# Patient Record
Sex: MALE | Race: WHITE | NOT HISPANIC OR LATINO | URBAN - METROPOLITAN AREA
[De-identification: names, ages, dates, MRNs, and addresses within clinical notes are randomized per-mention and may not be internally consistent; named-entity substitution may affect disease eponyms.]

---

## 2017-08-04 ENCOUNTER — OUTPATIENT (OUTPATIENT)
Dept: OUTPATIENT SERVICES | Facility: HOSPITAL | Age: 57
LOS: 1 days | End: 2017-08-04

## 2019-12-09 ENCOUNTER — APPOINTMENT (OUTPATIENT)
Dept: RADIOLOGY | Facility: CLINIC | Age: 59
End: 2019-12-09
Payer: COMMERCIAL

## 2019-12-09 PROCEDURE — 71046 X-RAY EXAM CHEST 2 VIEWS: CPT

## 2020-01-27 ENCOUNTER — APPOINTMENT (OUTPATIENT)
Dept: ULTRASOUND IMAGING | Facility: CLINIC | Age: 60
End: 2020-01-27
Payer: COMMERCIAL

## 2020-01-27 PROCEDURE — 93970 EXTREMITY STUDY: CPT

## 2020-11-05 PROBLEM — Z00.00 ENCOUNTER FOR PREVENTIVE HEALTH EXAMINATION: Status: ACTIVE | Noted: 2020-11-05

## 2021-04-29 ENCOUNTER — NON-APPOINTMENT (OUTPATIENT)
Age: 61
End: 2021-04-29

## 2021-04-30 ENCOUNTER — NON-APPOINTMENT (OUTPATIENT)
Age: 61
End: 2021-04-30

## 2021-04-30 ENCOUNTER — APPOINTMENT (OUTPATIENT)
Dept: CARDIOLOGY | Facility: CLINIC | Age: 61
End: 2021-04-30
Payer: COMMERCIAL

## 2021-04-30 VITALS
DIASTOLIC BLOOD PRESSURE: 80 MMHG | OXYGEN SATURATION: 96 % | WEIGHT: 171 LBS | BODY MASS INDEX: 25.91 KG/M2 | TEMPERATURE: 97.7 F | HEIGHT: 68 IN | HEART RATE: 74 BPM | SYSTOLIC BLOOD PRESSURE: 142 MMHG

## 2021-04-30 DIAGNOSIS — F10.21 ALCOHOL DEPENDENCE, IN REMISSION: ICD-10-CM

## 2021-04-30 DIAGNOSIS — Z82.0 FAMILY HISTORY OF EPILEPSY AND OTHER DISEASES OF THE NERVOUS SYSTEM: ICD-10-CM

## 2021-04-30 DIAGNOSIS — Z78.9 OTHER SPECIFIED HEALTH STATUS: ICD-10-CM

## 2021-04-30 DIAGNOSIS — F12.90 CANNABIS USE, UNSPECIFIED, UNCOMPLICATED: ICD-10-CM

## 2021-04-30 DIAGNOSIS — R45.89 OTHER SYMPTOMS AND SIGNS INVOLVING EMOTIONAL STATE: ICD-10-CM

## 2021-04-30 DIAGNOSIS — Z82.3 FAMILY HISTORY OF STROKE: ICD-10-CM

## 2021-04-30 DIAGNOSIS — Z85.46 PERSONAL HISTORY OF MALIGNANT NEOPLASM OF PROSTATE: ICD-10-CM

## 2021-04-30 DIAGNOSIS — Z82.49 FAMILY HISTORY OF ISCHEMIC HEART DISEASE AND OTHER DISEASES OF THE CIRCULATORY SYSTEM: ICD-10-CM

## 2021-04-30 PROCEDURE — 99072 ADDL SUPL MATRL&STAF TM PHE: CPT

## 2021-04-30 PROCEDURE — 93246 EXT ECG>7D<15D RECORDING: CPT

## 2021-04-30 PROCEDURE — 99205 OFFICE O/P NEW HI 60 MIN: CPT

## 2021-04-30 PROCEDURE — 93000 ELECTROCARDIOGRAM COMPLETE: CPT | Mod: 59

## 2021-04-30 RX ORDER — ENZALUTAMIDE 80 MG/1
TABLET ORAL DAILY
Refills: 0 | Status: ACTIVE | COMMUNITY

## 2021-04-30 RX ORDER — BICALUTAMIDE 50 MG/1
50 TABLET ORAL DAILY
Refills: 0 | Status: ACTIVE | COMMUNITY

## 2021-04-30 NOTE — HISTORY OF PRESENT ILLNESS
[FreeTextEntry1] : The patient is complaining of palpitations.  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  He describes a skipping-like sensation in his chest.  He feels a pause.  There has been no syncope.  There has been no near syncope.  The maximum duration of his palpitations is under 1 minute.  There is been no change recently.  The patient was seen by his primary care doctor and found to be slightly hypertensive.  The patient states that he does not take his blood pressure at home.  He previously was hypertensive and then quit alcohol.  He became normotensive off of pharmacologic therapy.  The patient has no exertional chest discomfort.  There is some dyspnea with heavy exertion which he feels may be normal for him.  The patient is extremely active.  He is a surf casting guide.

## 2021-04-30 NOTE — ASSESSMENT
[FreeTextEntry1] : Palpitations: Unfortunate the patient only has palpitations fairly infrequently.  14-day event monitoring has been arranged.\par \par Shortness of breath: Transthoracic echocardiography is indicated to assess for pericardial disease.  Exercise stress testing has been arranged to rule out ischemia.\par \par Hypertension: The patient will check his blood pressure at home.  Overall we have elected against pharmacologic therapy at this time but if he is persistently hypertensive at follow-up we will consider pharmacologic therapy.

## 2021-05-14 ENCOUNTER — APPOINTMENT (OUTPATIENT)
Dept: CARDIOLOGY | Facility: CLINIC | Age: 61
End: 2021-05-14
Payer: COMMERCIAL

## 2021-05-14 PROCEDURE — 99072 ADDL SUPL MATRL&STAF TM PHE: CPT

## 2021-05-14 PROCEDURE — 93306 TTE W/DOPPLER COMPLETE: CPT

## 2021-05-14 PROCEDURE — 93015 CV STRESS TEST SUPVJ I&R: CPT

## 2021-05-18 ENCOUNTER — NON-APPOINTMENT (OUTPATIENT)
Age: 61
End: 2021-05-18

## 2021-05-31 ENCOUNTER — NON-APPOINTMENT (OUTPATIENT)
Age: 61
End: 2021-05-31

## 2021-06-01 ENCOUNTER — NON-APPOINTMENT (OUTPATIENT)
Age: 61
End: 2021-06-01

## 2021-06-02 PROCEDURE — 99072 ADDL SUPL MATRL&STAF TM PHE: CPT

## 2021-06-02 PROCEDURE — 93248 EXT ECG>7D<15D REV&INTERPJ: CPT

## 2021-06-08 ENCOUNTER — APPOINTMENT (OUTPATIENT)
Dept: CARDIOLOGY | Facility: CLINIC | Age: 61
End: 2021-06-08
Payer: COMMERCIAL

## 2021-06-08 VITALS
HEART RATE: 86 BPM | TEMPERATURE: 97.6 F | WEIGHT: 165 LBS | HEIGHT: 68 IN | SYSTOLIC BLOOD PRESSURE: 136 MMHG | DIASTOLIC BLOOD PRESSURE: 66 MMHG | BODY MASS INDEX: 25.01 KG/M2 | OXYGEN SATURATION: 98 %

## 2021-06-08 DIAGNOSIS — I10 ESSENTIAL (PRIMARY) HYPERTENSION: ICD-10-CM

## 2021-06-08 PROCEDURE — 99214 OFFICE O/P EST MOD 30 MIN: CPT

## 2021-06-08 PROCEDURE — 99072 ADDL SUPL MATRL&STAF TM PHE: CPT

## 2021-06-08 NOTE — HISTORY OF PRESENT ILLNESS
[FreeTextEntry1] : Palpitations: Ever since taking his monitor off the patient has had no palpitations at all.  The patient shows good exercise capacity without exertional symptoms.\par \par High degree AV block: There was very brief high degree AV block on event monitoring.  It did not correlate with his symptoms.  The patient reported some symptoms during normal sinus rhythm.  The patient is reluctant to consider pacemaker therapy.  EP evaluation is scheduled.\par \par Hypertension: Well-controlled.

## 2021-06-28 ENCOUNTER — APPOINTMENT (OUTPATIENT)
Dept: ELECTROPHYSIOLOGY | Facility: CLINIC | Age: 61
End: 2021-06-28
Payer: COMMERCIAL

## 2021-06-28 ENCOUNTER — NON-APPOINTMENT (OUTPATIENT)
Age: 61
End: 2021-06-28

## 2021-06-28 VITALS
BODY MASS INDEX: 25.76 KG/M2 | DIASTOLIC BLOOD PRESSURE: 60 MMHG | TEMPERATURE: 97.5 F | SYSTOLIC BLOOD PRESSURE: 110 MMHG | WEIGHT: 170 LBS | OXYGEN SATURATION: 97 % | HEART RATE: 56 BPM | HEIGHT: 68 IN

## 2021-06-28 DIAGNOSIS — R00.2 PALPITATIONS: ICD-10-CM

## 2021-06-28 PROCEDURE — 99072 ADDL SUPL MATRL&STAF TM PHE: CPT

## 2021-06-28 PROCEDURE — 93000 ELECTROCARDIOGRAM COMPLETE: CPT

## 2021-06-28 PROCEDURE — 99203 OFFICE O/P NEW LOW 30 MIN: CPT

## 2021-06-28 RX ORDER — TAMSULOSIN HYDROCHLORIDE 0.4 MG/1
0.4 CAPSULE ORAL EVERY OTHER DAY
Refills: 0 | Status: DISCONTINUED | COMMUNITY
End: 2021-06-28

## 2021-07-11 PROBLEM — R00.2 PALPITATIONS: Status: ACTIVE | Noted: 2021-04-30

## 2021-07-11 NOTE — DISCUSSION/SUMMARY
[FreeTextEntry1] : Patient symptoms (palpitations) started with increased family stress as well as Covid infection.  His palpitations corresponded to skipped beats as well as  very short episodes of atrial tach 3-7 beats.   He did have an episode of AV block occurring around 11:30 AM with a short episode and this appeared to be second-degree.  His symptoms have since resolved and he is not having  palpitations.    His baseline EKG does show a borderline first-degree AV block.\par \par He does not need pacing this time but we will continue to monitor him and should he have recurrent symptoms we will get extended monitoring to see if he has recurrent atrial arrhythmias or AV block..  \par Also recommend a Lyme's titer.  \par Follow-up evaluation electrophysiology based on symptoms or abnormalities seen in follow-up monitoring..

## 2021-07-11 NOTE — CARDIOLOGY SUMMARY
[de-identified] : 6/28/2021: Sinus  Rhythm 62 bpm\par OR interval 210 ms, QRS duration 90 ms, QT interval 400 ms\par \par QRS axis normal

## 2021-07-11 NOTE — REVIEW OF SYSTEMS
[Feeling Fatigued] : feeling fatigued [Negative] : Musculoskeletal [Fever] : no fever [Blurry Vision] : no blurred vision [Sore Throat] : no sore throat [Dyspnea on exertion] : not dyspnea during exertion [Cough] : no cough [Abdominal Pain] : no abdominal pain [Hematuria] : no hematuria [Rash] : no rash [Dizziness] : no dizziness [Under Stress] : not under stress [Easy Bleeding] : no tendency for easy bleeding

## 2021-07-11 NOTE — PHYSICAL EXAM
[Well Developed] : well developed [No Acute Distress] : no acute distress [Normal Conjunctiva] : normal conjunctiva [Normal Venous Pressure] : normal venous pressure [Normal S1, S2] : normal S1, S2 [No Murmur] : no murmur [No Rub] : no rub [Clear Lung Fields] : clear lung fields [Non Tender] : non-tender [No Masses/organomegaly] : no masses/organomegaly [Normal Gait] : normal gait [No Edema] : no edema [No Rash] : no rash [Alert and Oriented] : alert and oriented

## 2021-07-11 NOTE — HISTORY OF PRESENT ILLNESS
[FreeTextEntry1] : The patient is a 61-year-old man who is seen in evaluation for AV block noted on the monitor.  He has had a prior history of palpitations.  He was referred to see cardiology because of symptoms related palpitations.  As a result of the palpitations he had a Zio patch monitor for 12 days.  This showed a very brief episode of type II AV block as well as very short episodes of atrial tach.  Electrocardiogram from today showed sinus rhythm with borderline first-degree prolongation.  Previous EKG done from April 2021 was similar.\par \par The patient had a monitor placed for 12 days between 4/30/2021 and 5/14/2021: It showed that he had an episode of type I AV block as well as type II AV block.  He had occasional APCs as well as PVCs.  His heart rate ranged from 27 bpm 150 bpm with a mean of 79 bpm.  He had APCs as well is very short runs of an atrial arrhythmia possibly atrial tach.  Patient had on 5/8/2021 at time 11: 40 3 AM 2.2-second episode with heartbeat went down to 27 bpm and he had evidence of type II AV block.  He has had tachycardia 250 bpm without any AV block.\par \par Patient had a stress test performed on 5/14/2021 he exercised for duration of 6: 51-minute Baseline heart rate 58 bpm peak heart rate 150 bpm.  He had no arrhythmia and no AV block seen during the stress test.\par \par Patient also had an echocardiogram performed on 5/which is/21 that showed EF 55 to 60%, minimal mitral regurgitation, left atrial volume index 45 cc/m², atrial septal aneurysm, normal left ventricular function both systolic as well as diastolic and no pericardial effusion.  The pulmonary pressures were normal.\par \par The patient's been alcohol free since 2006.\par \par He had a prior left total knee replacement.  The patient is a former runner.  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  His intake of caffeinated products is as above moderate.\par \par Had 10 days of radiation for prostate Ca- stage 4 mets to bones - chemo previously. Has had cleaned PET scans untill recently when a hot spot was seen left iliac. \par \par

## 2021-07-11 NOTE — CARDIOLOGY SUMMARY
[de-identified] : 6/28/2021: Sinus  Rhythm 62 bpm\par WV interval 210 ms, QRS duration 90 ms, QT interval 400 ms\par \par QRS axis normal

## 2022-05-12 ENCOUNTER — INPATIENT (INPATIENT)
Facility: HOSPITAL | Age: 62
LOS: 2 days | Discharge: HOME | End: 2022-05-15
Admitting: FAMILY MEDICINE
Payer: MEDICAID

## 2022-05-12 ENCOUNTER — OUTPATIENT (OUTPATIENT)
Dept: OUTPATIENT SERVICES | Facility: HOSPITAL | Age: 62
LOS: 1 days | End: 2022-05-12

## 2022-05-12 PROCEDURE — 71045 X-RAY EXAM CHEST 1 VIEW: CPT | Mod: 26

## 2022-05-12 PROCEDURE — 99285 EMERGENCY DEPT VISIT HI MDM: CPT

## 2022-05-12 PROCEDURE — 93010 ELECTROCARDIOGRAM REPORT: CPT

## 2022-05-12 PROCEDURE — 74176 CT ABD & PELVIS W/O CONTRAST: CPT | Mod: 26,ME

## 2022-05-12 PROCEDURE — G1004: CPT

## 2022-05-12 PROCEDURE — 76705 ECHO EXAM OF ABDOMEN: CPT | Mod: 26

## 2022-05-13 ENCOUNTER — OUTPATIENT (OUTPATIENT)
Dept: OUTPATIENT SERVICES | Facility: HOSPITAL | Age: 62
LOS: 1 days | End: 2022-05-13

## 2022-05-14 ENCOUNTER — OUTPATIENT (OUTPATIENT)
Dept: OUTPATIENT SERVICES | Facility: HOSPITAL | Age: 62
LOS: 1 days | End: 2022-05-14

## 2022-05-15 ENCOUNTER — OUTPATIENT (OUTPATIENT)
Dept: OUTPATIENT SERVICES | Facility: HOSPITAL | Age: 62
LOS: 1 days | End: 2022-05-15

## 2022-05-19 DIAGNOSIS — E87.1 HYPO-OSMOLALITY AND HYPONATREMIA: ICD-10-CM

## 2022-05-19 DIAGNOSIS — Z79.51 LONG TERM (CURRENT) USE OF INHALED STEROIDS: ICD-10-CM

## 2022-05-19 DIAGNOSIS — E83.39 OTHER DISORDERS OF PHOSPHORUS METABOLISM: ICD-10-CM

## 2022-05-19 DIAGNOSIS — K85.80 OTHER ACUTE PANCREATITIS WITHOUT NECROSIS OR INFECTION: ICD-10-CM

## 2022-05-19 DIAGNOSIS — Z79.52 LONG TERM (CURRENT) USE OF SYSTEMIC STEROIDS: ICD-10-CM

## 2022-05-19 DIAGNOSIS — C78.89 SECONDARY MALIGNANT NEOPLASM OF OTHER DIGESTIVE ORGANS: ICD-10-CM

## 2022-05-19 DIAGNOSIS — K29.80 DUODENITIS WITHOUT BLEEDING: ICD-10-CM

## 2022-05-19 DIAGNOSIS — Z87.891 PERSONAL HISTORY OF NICOTINE DEPENDENCE: ICD-10-CM

## 2022-05-19 DIAGNOSIS — C79.51 SECONDARY MALIGNANT NEOPLASM OF BONE: ICD-10-CM

## 2022-05-19 DIAGNOSIS — C61 MALIGNANT NEOPLASM OF PROSTATE: ICD-10-CM

## 2022-05-19 DIAGNOSIS — Z96.651 PRESENCE OF RIGHT ARTIFICIAL KNEE JOINT: ICD-10-CM

## 2022-05-19 DIAGNOSIS — E83.42 HYPOMAGNESEMIA: ICD-10-CM

## 2022-05-19 DIAGNOSIS — D64.9 ANEMIA, UNSPECIFIED: ICD-10-CM

## 2022-05-19 DIAGNOSIS — Z20.822 CONTACT WITH AND (SUSPECTED) EXPOSURE TO COVID-19: ICD-10-CM

## 2022-05-19 DIAGNOSIS — K86.81 EXOCRINE PANCREATIC INSUFFICIENCY: ICD-10-CM

## 2022-05-19 DIAGNOSIS — E87.6 HYPOKALEMIA: ICD-10-CM

## 2022-05-19 DIAGNOSIS — Z79.899 OTHER LONG TERM (CURRENT) DRUG THERAPY: ICD-10-CM

## 2022-05-19 DIAGNOSIS — E22.2 SYNDROME OF INAPPROPRIATE SECRETION OF ANTIDIURETIC HORMONE: ICD-10-CM

## 2022-05-24 ENCOUNTER — EMERGENCY (EMERGENCY)
Facility: HOSPITAL | Age: 62
LOS: 1 days | Discharge: HOME | End: 2022-05-24
Admitting: EMERGENCY MEDICINE
Payer: MEDICAID

## 2022-05-24 DIAGNOSIS — C61 MALIGNANT NEOPLASM OF PROSTATE: ICD-10-CM
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
## 2022-05-24 DIAGNOSIS — S81.012A LACERATION WITHOUT FOREIGN BODY, LEFT KNEE, INITIAL ENCOUNTER: ICD-10-CM

## 2022-05-24 DIAGNOSIS — E83.42 HYPOMAGNESEMIA: ICD-10-CM

## 2022-05-24 DIAGNOSIS — E87.1 HYPO-OSMOLALITY AND HYPONATREMIA: ICD-10-CM
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
## 2022-05-24 DIAGNOSIS — K29.80 DUODENITIS WITHOUT BLEEDING: ICD-10-CM
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
## 2022-05-24 DIAGNOSIS — M25.562 PAIN IN LEFT KNEE: ICD-10-CM

## 2022-05-24 PROCEDURE — 99284 EMERGENCY DEPT VISIT MOD MDM: CPT

## 2022-05-25 DIAGNOSIS — C61 MALIGNANT NEOPLASM OF PROSTATE: ICD-10-CM

## 2022-05-25 DIAGNOSIS — K29.80 DUODENITIS WITHOUT BLEEDING: ICD-10-CM

## 2022-05-25 DIAGNOSIS — E87.1 HYPO-OSMOLALITY AND HYPONATREMIA: ICD-10-CM

## 2022-05-25 DIAGNOSIS — E83.42 HYPOMAGNESEMIA: ICD-10-CM

## 2022-05-31 ENCOUNTER — TRANSCRIPTION ENCOUNTER (OUTPATIENT)
Age: 62
End: 2022-05-31

## 2024-01-03 ENCOUNTER — EMERGENCY (EMERGENCY)
Facility: HOSPITAL | Age: 64
LOS: 1 days | Discharge: HOME | End: 2024-01-03
Admitting: STUDENT IN AN ORGANIZED HEALTH CARE EDUCATION/TRAINING PROGRAM
Payer: COMMERCIAL

## 2024-01-03 VITALS
RESPIRATION RATE: 18 BRPM | SYSTOLIC BLOOD PRESSURE: 166 MMHG | HEART RATE: 81 BPM | OXYGEN SATURATION: 94 % | TEMPERATURE: 98 F | DIASTOLIC BLOOD PRESSURE: 95 MMHG

## 2024-01-03 PROCEDURE — 99284 EMERGENCY DEPT VISIT MOD MDM: CPT

## 2024-01-03 NOTE — ED PROVIDER NOTE - PATIENT PORTAL LINK FT
You can access the FollowMyHealth Patient Portal offered by [facility] by registering at the following website: [email or web address]. By joining [facility]’s FollowMyHealth portal, you will also be able to view your health information using other applications (apps) compatible with our system. You can access the FollowMyHealth Patient Portal offered by [facility] by registering at the following website: [email or web address]. By joining [employer]’s FollowMyHealth portal, you will also be able to view your health information using other applications (apps) compatible with our system.

## 2024-01-03 NOTE — ED ADULT TRIAGE NOTE - CHIEF COMPLAINT QUOTE
transfer from [facility] for OMFS consult for right orbit fracture pt arrives with right orbit ecchymosis pt had dilaudid at other facility

## 2024-01-03 NOTE — ED PROVIDER NOTE - OBJECTIVE STATEMENT
62 y/o M with PMH of prostate ca with mets to the bone currently on chemo presents for facial bone fractures s/p fall today. Patient was walking into a [employer] when he tripped in front of the store and landed face first on to the concrete. Patient thinks he might have had LOC at the time because he does not remember the immediate events after the fall so clearly. He was eventually assisted to stand and was able to walk in to the store where he waited for an ambulance to bring him to a local hospital. In the other hospital he had imaging done of his chest, neck, face, and head and was found to have right orbital wall fracture as well as a maxillary sinus fracture. Patient was transferred to our hospital in order to see OMFS for further recommendations and care. He endorses pain over the chest wall still but had negative imaging at the other hospital. Also notes having been able to see out of the right eye when opened but with some blurriness. Denies any other complaints or concerns. Denies SOB, cough, fevers, chills, abdominal pain, N/V/D/C, urinary complaints, dizziness, numbness, tingling, weakness, sick contacts, recent travel.

## 2024-01-03 NOTE — ED PROVIDER NOTE - CLINICAL SUMMARY MEDICAL DECISION MAKING FREE TEXT BOX
62 y/o M with PMH of prostate ca with mets to the bone currently on chemo presents for facial bone fractures s/p fall today. OMFS paged. Dilauded ordered for pain. 64 y/o M with PMH of prostate ca with mets to the bone currently on chemo presents for facial bone fractures s/p fall today. OMFS paged. Dilauded ordered for pain. 62 y/o M with PMH of prostate ca with mets to the bone currently on chemo presents for facial bone fractures s/p fall today. OMFS and opthalmology paged. Dilauded ordered for pain.  OMFS and ophthalmology did not require any acute intervention at this time. Additional recommendations documented in discharge instructions.  Patient comfortable and stable for discharge with pcp follow up.  Instructed to return to the ED immediately for any worsening symptoms or new concerns. 64 y/o M with PMH of prostate ca with mets to the bone currently on chemo presents for facial bone fractures s/p fall today. OMFS and opthalmology paged. Dilauded ordered for pain.  OMFS and ophthalmology did not require any acute intervention at this time. Additional recommendations documented in discharge instructions.  Patient comfortable and stable for discharge with pcp follow up.  Instructed to return to the ED immediately for any worsening symptoms or new concerns.

## 2024-01-03 NOTE — ED PROVIDER NOTE - PHYSICAL EXAMINATION
CONSTITUTIONAL: Comfortable; in no acute distress. Non-toxic appearing.   NEURO: Alert & oriented. Sensory and motor functions are grossly intact.  PSYCH: Mood appropriate. Thought processes intact.   HEAD: (+) large right eye hematoma and ecchymosis, with swelling of the eyelid shut. Small non-bleeding abrasion over right eyebrow. No lacerations or active bleeding.  CARD: Regular rate and rhythm, no murmurs  RESP: No accessory muscle use; breath sounds clear and equal bilaterally; no wheezes, rhonchi, or rales     ABD: Soft; non-distended; non-tender. No guarding or rebound.   MUSCULOSKELETAL/EXTREMITIES: FROM in all four extremities; no extremity edema.  SKIN: Warm; dry; no apparent lesions or exudate

## 2024-01-03 NOTE — ED PROVIDER NOTE - INTERNATIONAL TRAVEL
Pt presents by self from home for a suture that came out from his wisdom tooth extraction site that was done on Thursday. Pt saw his bone so concerned he may need another suture placed. A&Ox4 and ambulatory.   
No

## 2024-01-03 NOTE — ED ADULT TRIAGE NOTE - ARRIVAL FROM
[facility]iatric Facilities [facility]iatric Facilities

## 2024-01-03 NOTE — ED PROVIDER NOTE - NSFOLLOWUPINSTRUCTIONS_ED_ALL_ED_FT
Plan:  - Ice packs to right eye every 1 hour  - Erythromycin ointment 4 times a day to the right eye  - Artificial tears every 1-2 hours to the right eye  - Lubricate right eye aggressively given severity of chemosis   - Soft food diet x 2 week  - Clindamycin x 1 week  - Over the counter pain medicine as needed for pain  - Instructed to avoid nose blowing, straining, sneezing, no straws. Head of bed elevation at 30-degrees when at rest.  - 1 week OMFS outpatient f/u as needed- Please call [phone] to schedule appt  - 1 week follow-up with private ophthalmologist or with [facility] Department of Ophthalmology at: [address], [place] [address] by calling [phone]      Maxillofacial Fracture  A maxillofacial fracture, also called a midface fracture, is a break in the bones of the middle part of the face that form the upper jaw (maxilla). These bones include:  The maxilla.  The cheekbones.  The lower rim and floor of the eye socket (inferior orbital or orbital floor).  The opening to the nasal passages (nasal cavity).  Sometimes, maxillofacial fractures involve multiple facial bones, also called complex fractures, and may partially or completely detach from the skull ([place] fractures). [place] fractures can be very severe and can be life-threatening.    What are the causes?  Maxillofacial fractures are usually caused by strong, blunt force to the midface area. Causes include:  Motor vehicle accidents.  Contact sports accidents.  Combat sports or martial arts.  Injuries in sports that use hard balls or bats.  Falls.  Workplace accidents.  Violent assaults.    What are the signs or symptoms?  Symptoms of this condition include:  Swelling, bruising, and pain, or tenderness of the face.  Bleeding or blood clots in the nose or mouth.  Clear drainage from the nose.  A change in the appearance of the face (facial deformity) and numbness.  A change in how the teeth fit together (malocclusion).  Inability to close the mouth at all.  Double vision, blurry vision, or inability to move the affected eye normally.  Bleeding into the lining of the eyelid or white area of the eye (subconjunctival hemorrhage).  Trouble breathing, swallowing, or speaking.    How is this diagnosed?  This condition may be diagnosed based on your symptoms and a physical exam. The exam involves checking for:  Airway blockage and any life-threatening bleeding.  Facial deformity, such as a gap or dent in the upper jaw that can be felt or moved, or widening and flattening of the face.  Vision problems.  Numbness or paralysis of eye muscles or facial muscles, or both.  Damage to the teeth and to the inside of the mouth and nose.  You may also have tests, such as X-rays or a CT scan, to confirm your diagnosis and determine how severe your fracture is.    How is this treated?  Early treatment    Treatment depends on how severe the fracture is and where it is found. Treatment may start in the emergency room to make sure blood clots or swelling do not block breathing. Treatments may include:  Endotracheal tube. This is a breathing tube that may be put through the nose into the windpipe.  Tracheostomy. This procedure involves making an opening in the lower windpipe to put in a breathing tube.  Antibiotic medicines, pain medicines, and medicines to reduce swelling.  Treatments specific to the type of fracture.  Maxillomandibular fixation    In most cases, this condition may require a procedure to wire the upper teeth to the lower teeth (maxillomandibular fixation). You may need to have your teeth wired together for several weeks to stabilize the fracture during healing. For minor fractures, this may be the only treatment needed.    Fractures that are out of position (displaced) or unstable may require open reduction. This is a surgery to move the broken bones back into position and then support them with plates and screws or wires.    Follow these instructions at home:  Medicines    Take over-the-counter and prescription medicines only as told by your health care provider.  Take your antibiotic medicine as told by your health care provider. Do not stop taking the antibiotic even if you start to feel better.  Maxillomandibular fixation care    If your teeth have been wired together:  Follow instructions for caring for your mouth and teeth (oral hygiene).  Make sure you know what to do if you vomit. You may be given a  to cut the wires off your teeth.  Follow instructions from your health care provider about eating or drinking restrictions. You will need to remain on a liquid and pureed food diet while your teeth are wired together.  Incision care    Two stitched incisions. One is normal. The other is red with pus and infected.  If you have facial incisions, follow instructions from your health care provider about how to take care of your incisions. Make sure you:  Wash your hands with soap and water for at least 20 seconds before and after you change your bandage (dressing). If soap and water are not available, use hand .  Change your dressing as told by your health care provider.  Leave stitches (sutures), skin glue, or adhesive strips in place. These skin closures may need to stay in place for 2 weeks or longer. If adhesive strip edges start to loosen and curl up, you may trim the loose edges. Do not remove adhesive strips completely unless your health care provider tells you to do that.  Check your incision area every day for signs of infection. Check for:  More redness, swelling, or pain.  More fluid or blood.  Warmth.  Pus or a bad smell.  Managing pain, stiffness, and swelling    A bag of ice on a towel on the skin.   If directed, put ice on the affected area. To do this:  Put ice in a plastic bag.  Place a towel between your skin and the bag.  Leave the ice on for 20 minutes, 2–3 times a day.  Remove the ice if your skin turns bright red. This is very important. If you cannot feel pain, heat, or cold, you have a greater risk of damage to the area.  Raise (elevate) your head above the level of your heart while you are sitting or lying down.  General instructions    Do not take baths, swim, or use a hot tub until your health care provider approves. Ask your health care provider if you may take showers. You may only be allowed to take sponge baths.  Return to your normal activities as told by your health care provider. Ask your health care provider what activities are safe for you.  Do not use any products that contain nicotine or tobacco. These products include cigarettes, chewing tobacco, and vaping devices, such as e-cigarettes. If you need help quitting, ask your health care provider.  Do not drink alcohol.  Do not lift anything that is heavier than 10 lb (4.5 kg), or the limit that you are told, until your health care provider says that it is safe.  Keep all follow-up visits. This is important. This includes visits to have sutures removed and wires cut from your teeth.  Contact a health care provider if:  You have chills or a fever.  You notice any signs of infection. These include redness, increased pain, or foul-smelling discharge.  Your pain is not controlled with medicine.  Get help right away if:  You have trouble breathing.  You have a sudden increase in swelling.  You need to cut the wires off your teeth because of vomiting.  These symptoms may represent a serious problem that is an emergency. Do not wait to see if the symptoms will go away. Get medical help right away. Call your local emergency services (911 in the [place] Do not drive yourself to the hospital.    Summary  A maxillofacial fracture is a break in the bones of the middle part of your face. This injury is usually caused by strong, blunt force to the midface area. Fractures in these bones may interfere with breathing, vision, chewing, and talking. Maxillofacial fractures can be very severe.  Treatment of maxillofacial fractures depends on the severity and location of the fracture. Treatment may start in the emergency room to make sure that blood clots or swelling are not blocking breathing.  In most cases, this condition may require a procedure to wire the upper teeth to the lower teeth (maxillomandibular fixation).  Severe fractures may require a surgical procedure (open reduction) to move the broken bones back into place and put in plates and screws or wires.  Follow all home care instructions and keep all follow-up visits.  This information is not intended to replace advice given to you by your health care provider. Make sure you discuss any questions you have with your health care provider. Plan:  - Ice packs to right eye every 1 hour  - Erythromycin ointment 4 times a day to the right eye  - Artificial tears every 1-2 hours to the right eye  - Lubricate right eye aggressively given severity of chemosis   - Soft food diet x 2 week  - Clindamycin x 1 week  - Over the counter pain medicine as needed for pain  - Instructed to avoid nose blowing, straining, sneezing, no straws. Head of bed elevation at 30-degrees when at rest.  - 1 week OMFS outpatient f/u as needed- Please call [phone] to schedule appt  - 1 week follow-up with private ophthalmologist or with [facility] Department of Ophthalmology at: [address], [place] [address] by calling [phone]      Maxillofacial Fracture  A maxillofacial fracture, also called a midface fracture, is a break in the bones of the middle part of the face that form the upper jaw (maxilla). These bones include:  The maxilla.  The cheekbones.  The lower rim and floor of the eye socket (inferior orbital or orbital floor).  The opening to the nasal passages (nasal cavity).  Sometimes, maxillofacial fractures involve multiple facial bones, also called complex fractures, and may partially or completely detach from the skull ([place] fractures). [place] fractures can be very severe and can be life-threatening.    What are the causes?  Maxillofacial fractures are usually caused by strong, blunt force to the midface area. Causes include:  Motor vehicle accidents.  Contact sports accidents.  Combat sports or martial arts.  Injuries in sports that use hard balls or bats.  Falls.  Workplace accidents.  Violent assaults.    What are the signs or symptoms?  Symptoms of this condition include:  Swelling, bruising, and pain, or tenderness of the face.  Bleeding or blood clots in the nose or mouth.  Clear drainage from the nose.  A change in the appearance of the face (facial deformity) and numbness.  A change in how the teeth fit together (malocclusion).  Inability to close the mouth at all.  Double vision, blurry vision, or inability to move the affected eye normally.  Bleeding into the lining of the eyelid or white area of the eye (subconjunctival hemorrhage).  Trouble breathing, swallowing, or speaking.    How is this diagnosed?  This condition may be diagnosed based on your symptoms and a physical exam. The exam involves checking for:  Airway blockage and any life-threatening bleeding.  Facial deformity, such as a gap or dent in the upper jaw that can be felt or moved, or widening and flattening of the face.  Vision problems.  Numbness or paralysis of eye muscles or facial muscles, or both.  Damage to the teeth and to the inside of the mouth and nose.  You may also have tests, such as X-rays or a CT scan, to confirm your diagnosis and determine how severe your fracture is.    How is this treated?  Early treatment    Treatment depends on how severe the fracture is and where it is found. Treatment may start in the emergency room to make sure blood clots or swelling do not block breathing. Treatments may include:  Endotracheal tube. This is a breathing tube that may be put through the nose into the windpipe.  Tracheostomy. This procedure involves making an opening in the lower windpipe to put in a breathing tube.  Antibiotic medicines, pain medicines, and medicines to reduce swelling.  Treatments specific to the type of fracture.  Maxillomandibular fixation    In most cases, this condition may require a procedure to wire the upper teeth to the lower teeth (maxillomandibular fixation). You may need to have your teeth wired together for several weeks to stabilize the fracture during healing. For minor fractures, this may be the only treatment needed.    Fractures that are out of position (displaced) or unstable may require open reduction. This is a surgery to move the broken bones back into position and then support them with plates and screws or wires.    Follow these instructions at home:  Medicines    Take over-the-counter and prescription medicines only as told by your health care provider.  Take your antibiotic medicine as told by your health care provider. Do not stop taking the antibiotic even if you start to feel better.  Maxillomandibular fixation care    If your teeth have been wired together:  Follow instructions for caring for your mouth and teeth (oral hygiene).  Make sure you know what to do if you vomit. You may be given a  to cut the wires off your teeth.  Follow instructions from your health care provider about eating or drinking restrictions. You will need to remain on a liquid and pureed food diet while your teeth are wired together.  Incision care    Two stitched incisions. One is normal. The other is red with pus and infected.  If you have facial incisions, follow instructions from your health care provider about how to take care of your incisions. Make sure you:  Wash your hands with soap and water for at least 20 seconds before and after you change your bandage (dressing). If soap and water are not available, use hand .  Change your dressing as told by your health care provider.  Leave stitches (sutures), skin glue, or adhesive strips in place. These skin closures may need to stay in place for 2 weeks or longer. If adhesive strip edges start to loosen and curl up, you may trim the loose edges. Do not remove adhesive strips completely unless your health care provider tells you to do that.  Check your incision area every day for signs of infection. Check for:  More redness, swelling, or pain.  More fluid or blood.  Warmth.  Pus or a bad smell.  Managing pain, stiffness, and swelling    A bag of ice on a towel on the skin.   If directed, put ice on the affected area. To do this:  Put ice in a plastic bag.  Place a towel between your skin and the bag.  Leave the ice on for 20 minutes, 2–3 times a day.  Remove the ice if your skin turns bright red. This is very important. If you cannot feel pain, heat, or cold, you have a greater risk of damage to the area.  Raise (elevate) your head above the level of your heart while you are sitting or lying down.  General instructions    Do not take baths, swim, or use a hot tub until your health care provider approves. Ask your health care provider if you may take showers. You may only be allowed to take sponge baths.  Return to your normal activities as told by your health care provider. Ask your health care provider what activities are safe for you.  Do not use any products that contain nicotine or tobacco. These products include cigarettes, chewing tobacco, and vaping devices, such as e-cigarettes. If you need help quitting, ask your health care provider.  Do not drink alcohol.  Do not lift anything that is heavier than 10 lb (4.5 kg), or the limit that you are told, until your health care provider says that it is safe.  Keep all follow-up visits. This is important. This includes visits to have sutures removed and wires cut from your teeth.  Contact a health care provider if:  You have chills or a fever.  You notice any signs of infection. These include redness, increased pain, or foul-smelling discharge.  Your pain is not controlled with medicine.  Get help right away if:  You have trouble breathing.  You have a sudden increase in swelling.  You need to cut the wires off your teeth because of vomiting.  These symptoms may represent a serious problem that is an emergency. Do not wait to see if the symptoms will go away. Get medical help right away. Call your local emergency services (911 in the [place] Do not drive yourself to the hospital.    Summary  A maxillofacial fracture is a break in the bones of the middle part of your face. This injury is usually caused by strong, blunt force to the midface area. Fractures in these bones may interfere with breathing, vision, chewing, and talking. Maxillofacial fractures can be very severe.  Treatment of maxillofacial fractures depends on the severity and location of the fracture. Treatment may start in the emergency room to make sure that blood clots or swelling are not blocking breathing.  In most cases, this condition may require a procedure to wire the upper teeth to the lower teeth (maxillomandibular fixation).  Severe fractures may require a surgical procedure (open reduction) to move the broken bones back into place and put in plates and screws or wires.  Follow all home care instructions and keep all follow-up visits.  This information is not intended to replace advice given to you by your health care provider. Make sure you discuss any questions you have with your health care provider.

## 2024-01-04 VITALS
HEART RATE: 95 BPM | OXYGEN SATURATION: 95 % | RESPIRATION RATE: 18 BRPM | DIASTOLIC BLOOD PRESSURE: 81 MMHG | TEMPERATURE: 98 F | SYSTOLIC BLOOD PRESSURE: 159 MMHG

## 2024-01-04 RX ORDER — HYDROMORPHONE HYDROCHLORIDE 2 MG/ML
0.5 INJECTION INTRAMUSCULAR; INTRAVENOUS; SUBCUTANEOUS ONCE
Refills: 0 | Status: DISCONTINUED | OUTPATIENT
Start: 2024-01-04 | End: 2024-01-04

## 2024-01-04 RX ORDER — ERYTHROMYCIN BASE 5 MG/GRAM
1 OINTMENT (GRAM) OPHTHALMIC (EYE)
Qty: 1 | Refills: 0
Start: 2024-01-04

## 2024-01-04 RX ORDER — SODIUM CHLORIDE 9 MG/ML
1000 INJECTION INTRAMUSCULAR; INTRAVENOUS; SUBCUTANEOUS ONCE
Refills: 0 | Status: COMPLETED | OUTPATIENT
Start: 2024-01-04 | End: 2024-01-04

## 2024-01-04 RX ADMIN — HYDROMORPHONE HYDROCHLORIDE 0.5 MILLIGRAM(S): 2 INJECTION INTRAMUSCULAR; INTRAVENOUS; SUBCUTANEOUS at 02:33

## 2024-01-04 RX ADMIN — SODIUM CHLORIDE 1000 MILLILITER(S): 9 INJECTION INTRAMUSCULAR; INTRAVENOUS; SUBCUTANEOUS at 02:37

## 2024-01-04 NOTE — CONSULT NOTE ADULT - ASSESSMENT
63y male with a past medical history/ocular history of prostate ca with mets to the bone currently on chemo consulted for right orbital floor fracture.    #Right orbital floor fracture  #Subconj heme and chemosis, right eye  -CT showing right facial and periorbital soft tissue swelling. Focal swelling and hematoma anterior right scalp. Right comminuted orbital floor fracture. Right maxillary sinus fractures along the medial wall. Complete opacification of right maxillary sinus with increased density. Scattered fluid in the right ethmoid air cells.  -EOM restricted 2/2 chemosis  -No entrapment symptoms i.e nausea/vomiting, bradycardia.  -On exam, VA 20/40-, no RAPD, IOP 25, CVF full, color plates full  -No indication for surgical intervention from ophthalmic standpoint  -OMFS/facial plastics evaluation before discharge  -Ice packs to right eye q1h  -erythromycin ointment QID to the right eye  -artificial tears q1-2h to the right eye  -lubricate right eye aggressively given severity of chemosis   -Pt was instructed to avoid nose blowing, straining, sneezing, no straws. Head of bed elevation at 30-degrees when at rest.    Outpatient Follow-up: Patient should follow-up with his/her ophthalmologist or with [facility] Department of Ophthalmology within 1 week of after discharge at:    [address]  [place] [address]  [phone]    Landon Ivan MD, PGY-2  Also available on [employer] Teams     63y male with a past medical history/ocular history of prostate ca with mets to the bone currently on chemo consulted for right orbital floor fracture.    #Right orbital floor fracture  #Subconj heme and chemosis, right eye  -CT showing right facial and periorbital soft tissue swelling. Focal swelling and hematoma anterior right scalp. Right comminuted orbital floor fracture. Right maxillary sinus fractures along the medial wall. Complete opacification of right maxillary sinus with increased density. Scattered fluid in the right ethmoid air cells.  -EOM restricted 2/2 chemosis  -No entrapment symptoms i.e nausea/vomiting, bradycardia.  -On exam, VA 20/40-, no RAPD, IOP 25, CVF full, color plates full  -No indication for surgical intervention from ophthalmic standpoint  -OMFS/facial plastics evaluation before discharge  -Ice packs to right eye q1h  -erythromycin ointment QID to the right eye  -artificial tears q1-2h to the right eye  -lubricate right eye aggressively given severity of chemosis   -Pt was instructed to avoid nose blowing, straining, sneezing, no straws. Head of bed elevation at 30-degrees when at rest.    Outpatient Follow-up: Patient should follow-up with his/her ophthalmologist or with [facility] Department of Ophthalmology within 1 week of after discharge at:    [address]  [place] [address]  [phone]    Landon Ivan MD, PGY-2  Also available on [employer] Teams

## 2024-01-04 NOTE — CONSULT NOTE ADULT - SUBJECTIVE AND OBJECTIVE BOX
HPI:  63M w/ PMHx of stage 4 prostate cancer w/ bony mestastasis (pt reports hx of chemo started in 2020), hx of knee replacement, shoulder surgery, presents as a transfer from [facility] ED to [facility] ED w/ for Right-sided orbital floor fx s/p fall on the concrete outdoors, landing his face first with uncertain LOC. Pt endorses pain over the chest, but denies SOB. Pt endorses blurry vision and the inability to open the eye fully , but can still see with the right eye when he forcibly opens the eyelid. Pt also denies headache, nausea, vertigo, tinnitus, change in the sensation on thenface, change in the occlusion, trismus, SOB.    PAST MEDICAL & SURGICAL HISTORY:  - Prostate CA (stage 4- diagnosed Sept 2019), (chemo 1X a month)  - knee replacement  - b/l shoulder surgery    Home Medications:    MEDICATIONS  (STANDING):    MEDICATIONS  (PRN):      Allergies    penicillins (Anaphylaxis; Hives; Short breath)  morphine (Short breath; Anaphylaxis)    Intolerances    ICU Vital Signs Last 24 Hrs  T(C): 36.8 (03 Jan 2024 21:01), Max: 36.8 (03 Jan 2024 18:44)  T(F): 98.3 (03 Jan 2024 21:01), Max: 98.3 (03 Jan 2024 21:01)  HR: 87 (03 Jan 2024 21:01) (81 - 87)  BP: 155/87 (03 Jan 2024 21:01) (155/87 - 166/95)  BP(mean): --  ABP: --  ABP(mean): --  RR: 18 (03 Jan 2024 21:01) (18 - 18)  SpO2: 95% (03 Jan 2024 21:01) (94% - 95%)    O2 Parameters below as of 03 Jan 2024 21:01  Patient On (Oxygen Delivery Method): room air    Focused Exam  Gen: AAOx3, NAD  H: skin abrasions on the chin, forehead, left zygomatic area, hemostatic, no step-off defects, NC, focal scalp edema and ecchymosis above the R eye brow, no skin laceration   E: Right-sided periorbital ecchymosis, firm edema, unable to fully force the eyelid to open, Right eye EOMI, Left eye PERRL, EOMI  E: no otorrhea, hearing at baseline  N: dried blood crusts R nare, no crepitus, no septal hematoma, no nasal ridge deviation  Maxillofacial: no intraoral segmental mobility, occlusion stable and reproducible b/l, dentition intact b/l, no intraoral laceration, FOM soft and non-raised, uvula midline, TRACY~40mm  T: trachea midline  Resp: unlabored resp on RA  Ext: wwp  Neuro: V1-V3 intact b/l, CN7 intact      CT Maxillofacial from [facility] (1/3/2024)    ACC: [id number]     EXAM:  CT CERVICAL SPINE   ORDERED BY: YEVGENIY BALL    ACC: [id number]     EXAM:  CT MAXILLOFACIAL   ORDERED BY: YEVGENIY BALL    ACC: [id number]     EXAM:  CT BRAIN   ORDERED BY: YEVGENIY BALL    PROCEDURE DATE:  01/03/2024        INTERPRETATION:  CT HEAD, CT MAXILLOFACIAL, CT CERVICAL SPINE    INDICATIONS: facial trauma, 63-year-old male with past medical history metastatic prostate cancer past surgical history total knee replacement presents here status post mechanical fall standing height with right facial injury.    CT BRAIN:    TECHNIQUE:  Multiple contiguous axial images were obtained from the skull base to the vertex without the use of intravenous contrast.    COMPARISON EXAMINATION: None available at this time.    FINDINGS:  Ventricles and sulci: Parenchymal volume loss is present which is commensurate with patient age.  Intra-axial: There are hemispheric white matter areas of low attenuation which are nonspecific but likely related to sequelae of microvascular disease.  No intracranial mass, acute hemorrhage, or significant midline shift is present.  Extra-axial: There is no extra-axial collection.  Visualized sinuses: No air-fluid levels are identified. Partial opacification of right maxillary sinus and right ethmoid air cells.  Visualized mastoids:  Clear.  Calvarium: Large right anterior scalp focal soft tissue swelling and hematoma. No skull fracture.  Miscellaneous:  Right periorbital soft tissue swelling.    Impression: See below    ======================================================================================    MAXILLOFACIAL CT:    TECHNIQUE: This examination consists of axial 1.25 mm sections from the frontal sinuses through the mandible.  The study was supplemented with coronal and sagittal reformatted images.  Dedicated 3-D images were made using dedicated software and viewed on a dedicated 3-D workstation in multiple planes.    Intravenous contrast was not administered.    FINDINGS:    Right facial and periorbital soft tissue swelling. Focal swelling and hematoma anterior right scalp.    Right comminuted orbital floor fracture. There is some mild retrobulbar are stranding.    Right maxillary sinus fractures along the medial wall. Complete opacification of right maxillary sinus with increased density which appears to herniate up into the orbit slightly with some mild mass effect on the inferior rectus. No entrapment. Scattered fluid in the right ethmoid air cells.    Dental caries in the right maxillary molar. Increased mineralization in the left mandible, suspicious for metastatic disease in this patient with known prostate cancer.    The nasopharynx is normal in appearance.    The parotid glands are normal and symmetric in appearance.    The  space is normal bilaterally.    Parapharyngeal space is normal bilaterally.    The tongue base is normal in appearance.    The epiglottis is normal in thickness and appearance.    [place] tonsils are normal in appearance.    The submandibular glands are normal and symmetric in appearance bilaterally.    The platysma is normal in thickness.    Sternocleidomastoid muscles are normal and symmetric in appearance bilaterally where visualized.    Anterior strap muscles are unremarkable where visualized.    Carotid space is normal bilaterally where visualized.    No suspicious adenopathy.      IMPRESSION: See below    ======================================================================================    CT CERVICAL SPINE:    TECHNIQUE:  Axial images were obtained through the cervical spine using multislice helical technique.  Reformatted coronal and sagittal images were performed.    COMPARISON EXAMINATION:  None available at this time.    FINDINGS:  On the sagittal reformations, there is no prevertebral soft tissue swelling. There is no splaying of the spinous processes. Grade 1 anterolisthesis of C4 on C5. Straightening of the normal lordotic curvature.  On the coronal reformations, occipital condyles are normal. Lateral masses of C1 align normally with C2.  On the axial images, no lucent fracture line is identified.  Small focal suspected metastatic bone lesions in C2.    Multilevel degenerative osteoarthritis is present. Findings include marginal osteophytes, uncovertebral spurring, and facet joint space compartment narrowing with subchondral sclerosis and hypertrophic osteophytes at multiple levels. There is multilevel degenerative disc disease. Findings include loss of normal disc space height and endplate sclerosis.    Miscellaneous:  Suspected calcified right-sided thyroid nodule.    IMPRESSIONS:    Head CT: No CT evidence of acute intracranial hemorrhage.    Maxillofacial CT:  Right facial and periorbital soft tissue swelling. Focal swelling and hematoma anterior right scalp.    Right comminuted orbital floor fracture.    Right maxillary sinus fractures along the medial wall. Complete opacification of right maxillary sinus with increased density. Scattered fluid in the right ethmoid air cells.    Dental caries in the right maxillary molar.    Increased mineralization in the left mandible, suspicious for metastatic disease in this patient with known prostate cancer.    C-spine CT:  No acute fracture.    Small focal suspected metastatic bone lesions in C2.    Discussed with MARINA bateman in the ED at 2:49 PM.    --- End of Report ---            KAVYA MA MD; Attending Radiologist  This document has been electronically signed. Luis Alberto  3 2024  2:51PM HPI:  63M w/ PMHx of stage 4 prostate cancer w/ bony mestastasis (pt reports hx of chemo started in 2020), hx of knee replacement, shoulder surgery, presents as a transfer from [facility] ED to [facility] ED w/ for Right-sided orbital floor fx s/p fall on the concrete outdoors, landing his face first with uncertain LOC. Pt endorses pain over the chest, but denies SOB. Pt endorses blurry vision and the inability to open the eye fully , but can still see with the right eye when he forcibly opens the eyelid. Pt also denies headache, nausea, vertigo, tinnitus, change in the sensation on thenface, change in the occlusion, trismus, SOB.    PAST MEDICAL & SURGICAL HISTORY:  - Prostate CA (stage 4- diagnosed Sept 2019), (chemo 1X a month)  - knee replacement  - b/l shoulder surgery    Home Medications:    MEDICATIONS  (STANDING):    MEDICATIONS  (PRN):      Allergies    penicillins (Anaphylaxis; Hives; Short breath)  morphine (Short breath; Anaphylaxis)    Intolerances    ICU Vital Signs Last 24 Hrs  T(C): 36.8 (03 Jan 2024 21:01), Max: 36.8 (03 Jan 2024 18:44)  T(F): 98.3 (03 Jan 2024 21:01), Max: 98.3 (03 Jan 2024 21:01)  HR: 87 (03 Jan 2024 21:01) (81 - 87)  BP: 155/87 (03 Jan 2024 21:01) (155/87 - 166/95)  BP(mean): --  ABP: --  ABP(mean): --  RR: 18 (03 Jan 2024 21:01) (18 - 18)  SpO2: 95% (03 Jan 2024 21:01) (94% - 95%)    O2 Parameters below as of 03 Jan 2024 21:01  Patient On (Oxygen Delivery Method): room air    Focused Exam  Gen: AAOx3, NAD  H: skin abrasions on the chin, forehead, left zygomatic area, hemostatic, no step-off defects, NC, focal scalp edema and ecchymosis above the R eye brow, no skin laceration   E: Right-sided periorbital ecchymosis, firm edema, unable to fully force the eyelid to open, Right eye EOMI, Left eye PERRL, EOMI  E: no otorrhea, hearing at baseline  N: dried blood crusts R nare, no crepitus, no septal hematoma, no nasal ridge deviation  Maxillofacial: no intraoral segmental mobility, occlusion stable and reproducible b/l, dentition intact b/l, no intraoral laceration, FOM soft and non-raised, uvula midline, TRACY~40mm  T: trachea midline  Resp: unlabored resp on RA  Ext: wwp  Neuro: V1-V3 intact b/l, CN7 intact      CT Maxillofacial from [facility] (1/3/2024)    ACC: [id number]     EXAM:  CT CERVICAL SPINE   ORDERED BY: YEVGENIY BALL    ACC: [id number]     EXAM:  CT MAXILLOFACIAL   ORDERED BY: YEVGENIY BALL    ACC: [id number]     EXAM:  CT BRAIN   ORDERED BY: YEVGENIY BALL    PROCEDURE DATE:  01/03/2024        INTERPRETATION:  CT HEAD, CT MAXILLOFACIAL, CT CERVICAL SPINE    INDICATIONS: facial trauma, 63-year-old male with past medical history metastatic prostate cancer past surgical history total knee replacement presents here status post mechanical fall standing height with right facial injury.    CT BRAIN:    TECHNIQUE:  Multiple contiguous axial images were obtained from the skull base to the vertex without the use of intravenous contrast.    COMPARISON EXAMINATION: None available at this time.    FINDINGS:  Ventricles and sulci: Parenchymal volume loss is present which is commensurate with patient age.  Intra-axial: There are hemispheric white matter areas of low attenuation which are nonspecific but likely related to sequelae of microvascular disease.  No intracranial mass, acute hemorrhage, or significant midline shift is present.  Extra-axial: There is no extra-axial collection.  Visualized sinuses: No air-fluid levels are identified. Partial opacification of right maxillary sinus and right ethmoid air cells.  Visualized mastoids:  Clear.  Calvarium: Large right anterior scalp focal soft tissue swelling and hematoma. No skull fracture.  Miscellaneous:  Right periorbital soft tissue swelling.    Impression: See below    ======================================================================================    MAXILLOFACIAL CT:    TECHNIQUE: This examination consists of axial 1.25 mm sections from the frontal sinuses through the mandible.  The study was supplemented with coronal and sagittal reformatted images.  Dedicated 3-D images were made using dedicated software and viewed on a dedicated 3-D workstation in multiple planes.    Intravenous contrast was not administered.    FINDINGS:    Right facial and periorbital soft tissue swelling. Focal swelling and hematoma anterior right scalp.    Right comminuted orbital floor fracture. There is some mild retrobulbar are stranding.    Right maxillary sinus fractures along the medial wall. Complete opacification of right maxillary sinus with increased density which appears to herniate up into the orbit slightly with some mild mass effect on the inferior rectus. No entrapment. Scattered fluid in the right ethmoid air cells.    Dental caries in the right maxillary molar. Increased mineralization in the left mandible, suspicious for metastatic disease in this patient with known prostate cancer.    The nasopharynx is normal in appearance.    The parotid glands are normal and symmetric in appearance.    The  space is normal bilaterally.    Parapharyngeal space is normal bilaterally.    The tongue base is normal in appearance.    The epiglottis is normal in thickness and appearance.    [place] tonsils are normal in appearance.    The submandibular glands are normal and symmetric in appearance bilaterally.    The platysma is normal in thickness.    Sternocleidomastoid muscles are normal and symmetric in appearance bilaterally where visualized.    Anterior strap muscles are unremarkable where visualized.    Carotid space is normal bilaterally where visualized.    No suspicious adenopathy.      IMPRESSION: See below    ======================================================================================    CT CERVICAL SPINE:    TECHNIQUE:  Axial images were obtained through the cervical spine using multislice helical technique.  Reformatted coronal and sagittal images were performed.    COMPARISON EXAMINATION:  None available at this time.    FINDINGS:  On the sagittal reformations, there is no prevertebral soft tissue swelling. There is no splaying of the spinous processes. Grade 1 anterolisthesis of C4 on C5. Straightening of the normal lordotic curvature.  On the coronal reformations, occipital condyles are normal. Lateral masses of C1 align normally with C2.  On the axial images, no lucent fracture line is identified.  Small focal suspected metastatic bone lesions in C2.    Multilevel degenerative osteoarthritis is present. Findings include marginal osteophytes, uncovertebral spurring, and facet joint space compartment narrowing with subchondral sclerosis and hypertrophic osteophytes at multiple levels. There is multilevel degenerative disc disease. Findings include loss of normal disc space height and endplate sclerosis.    Miscellaneous:  Suspected calcified right-sided thyroid nodule.    IMPRESSIONS:    Head CT: No CT evidence of acute intracranial hemorrhage.    Maxillofacial CT:  Right facial and periorbital soft tissue swelling. Focal swelling and hematoma anterior right scalp.    Right comminuted orbital floor fracture.    Right maxillary sinus fractures along the medial wall. Complete opacification of right maxillary sinus with increased density. Scattered fluid in the right ethmoid air cells.    Dental caries in the right maxillary molar.    Increased mineralization in the left mandible, suspicious for metastatic disease in this patient with known prostate cancer.    C-spine CT:  No acute fracture.    Small focal suspected metastatic bone lesions in C2.    Discussed with MARINA bateman in the ED at 2:49 PM.    --- End of Report ---            KAVYA MA MD; Attending Radiologist  This document has been electronically signed. Luis Alberto  3 2024  2:51PM

## 2024-01-04 NOTE — ED ADULT NURSE NOTE - OBJECTIVE STATEMENT
received pt to [id number]. A&OX4 RR even unlabored completing full clear sentences. pt arrives as transfer from [facility] for OMFS consult for right orbit fracture. pt arrives with right orbit ecchymosis and swelling. pt states trip and fell over curb landing on right sided chest and face. pt states received dilaudid at other facility. pt has 20gIV to R FA, flushes well with + blood return. endorsing increasing pain, provider made aware and medicated per provider orders. denies h/a, dizziness/lightheadedness, abd pain, n/v/d, fevers/chills, gu sx, sob. stretcher lowest position siderails up safety measures maintained received pt to [id number]. A&OX4 RR even unlabored completing full clear sentences. pt arrives as transfer from [facility] for OMFS consult for right orbit fracture. pt arrives with right orbit ecchymosis and swelling. pt states trip and fell over curb landing on right sided chest and face. pt states received dilaudid at other facility. pt has 20gIV to R FA, flushes well with + blood return. endorsing increasing pain, provider made aware and medicated per provider orders. denies h/a, dizziness/lightheadedness, abd pain, n/v/d, fevers/chills, gu sx, sob. stretcher lowest position siderails up safety measures maintained

## 2024-01-04 NOTE — PROVIDER CONTACT NOTE (OTHER) - ASSESSMENT
Met with pt at bedside-pt reports he lives in [facility]; does not have his car here as he was transported by EMS.  Discussed options to get home-pt states he may contact a friend to bring him suleiman, however she will not be available until later this AM, around 8 or 9.  Pt insurance does not cover a taxi home. Met with pt at bedside-pt reports he lives in [facility]; does not have his car here as he was transported by EMS.  Discussed options to get home-pt states he may contact a friend to bring him suleiman, however she will not be available until later this AM, around 8 or 9.  Pt insurance does not cover a taxi home.

## 2024-01-04 NOTE — PROVIDER CONTACT NOTE (OTHER) - BACKGROUND
Pt was transferred from [facility]; [facility] [place] [facility] [place] [facility] Pt was transferred from [facility]; pt lives in [facility]

## 2024-01-04 NOTE — ED ADULT NURSE NOTE - NSFALLUNIVINTERV_ED_ALL_ED
Bed/Stretcher in lowest position, wheels locked, appropriate side rails in place/Call bell, personal items and telephone in reach/Instruct patient to call for assistance before getting out of bed/chair/stretcher/Non-slip footwear applied when patient is off stretcher/[place] to call system/Physically safe environment - no spills, clutter or unnecessary equipment/Purposeful proactive rounding/Room/bathroom lighting operational, light cord in reach Bed/Stretcher in lowest position, wheels locked, appropriate side rails in place/Call bell, personal items and telephone in reach/Instruct patient to call for assistance before getting out of bed/chair/stretcher/Non-slip footwear applied when patient is off stretcher/[place] to call system/Physically safe environment - no spills, clutter or unnecessary equipment/Purposeful proactive rounding/Room/bathroom lighting operational, light cord in reach

## 2024-01-04 NOTE — CONSULT NOTE ADULT - SUBJECTIVE AND OBJECTIVE BOX
[facility] DEPARTMENT OF OPHTHALMOLOGY - INITIAL ADULT CONSULT  ----------------------------------------------------------------------------------------------------  Landon Ivan MD PGY-2  Available on teams  ----------------------------------------------------------------------------------------------------    HPI: 62 y/o M with PMH of prostate ca with mets to the bone currently on chemo presents for facial bone fractures s/p fall today. Patient was walking into a [employer] when he tripped in front of the store and landed face first on to the concrete. Patient thinks he might have had LOC at the time because he does not remember the immediate events after the fall so clearly. He was eventually assisted to stand and was able to walk in to the store where he waited for an ambulance to bring him to a local hospital. In the other hospital he had imaging done of his chest, neck, face, and head and was found to have right orbital wall fracture as well as a maxillary sinus fracture. Patient was transferred to our hospital in order to see OMFS for further recommendations and care. He endorses pain over the chest wall still but had negative imaging at the other hospital. Also notes having been able to see out of the right eye when opened but with some blurriness. Denies any other complaints or concerns. Denies SOB, cough, fevers, chills, abdominal pain, N/V/D/C, urinary complaints, dizziness, numbness, tingling, weakness, sick contacts, recent travel.    Interval History: Pt states vision is around baseline. No n/v with EOMs.     PAST MEDICAL & SURGICAL HISTORY:    Past Ocular History: none  Ophthalmic Medications: none  FAMILY HISTORY:    Social History: denies etoh/smoking    MEDICATIONS  (STANDING):    MEDICATIONS  (PRN):    Allergies & Intolerances:   morphine (Short breath; Anaphylaxis)    Review of Systems:  Constitutional: No fever, chills  Eyes: swelling, pain  Neuro: No tremors  Cardiovascular: No chest pain, palpitations  Respiratory: No SOB, no cough  GI: No nausea, vomiting, abdominal pain  : No dysuria  Skin: no rash  Psych: no depression  Endocrine: no polyuria, polydipsia  Heme/lymph: no swelling    VITALS: T(C): 36.8 (01-03-24 @ 21:01)  T(F): 98.3 (01-03-24 @ 21:01), Max: 98.3 (01-03-24 @ 21:01)  HR: 87 (01-03-24 @ 21:01) (81 - 87)  BP: 155/87 (01-03-24 @ 21:01) (155/87 - 166/95)  RR:  (18 - 18)  SpO2:  (94% - 95%)  Wt(kg): --  General: AAO x 3, appropriate mood and affect    Ophthalmology Exam:  Visual acuity (cc): 20/40- OD, 20/30 OS  Pupils: PERRL OU, no APD  Ttono: 25 OD, 13 OS  Extraocular movements (EOMs): Restricted OD 2/2 chemosis; Full OS, no pain, no diplopia  Confrontational Visual Field (CVF): Full OU  Color Plates: 12/12 OD, 12/12 OS    Pen Light Exam (PLE)  External: Edema and ecchymosis OD, flat OS  Lids/Lashes/Lacrimal Ducts: Edema and ecchymosis OD, flat OS  Sclera/Conjunctiva: 4+chemosis and subconj heme OD, W+Q OS  Cornea: Cl OU  Anterior Chamber: D+F OU    Iris: Flat OU  Lens: Cl OU    Fundus Exam: dilated with 1% tropicamide and 2.5% phenylephrine  Approval obtained from Jerri Delacruz at 12:04am for dilation  Patient aware that pupils can remained dilated for at least 4-6 hours  Exam performed with 20D lens    Vitreous: wnl OU  Disc, cup/disc: sharp and pink, 0.4 OU  Macula: wnl OU  Vessels: wnl OU  Periphery: wnl OU    Labs/Imaging:  IMPRESSIONS:    Head CT: No CT evidence of acute intracranial hemorrhage.    Maxillofacial CT:  Right facial and periorbital soft tissue swelling. Focal swelling and hematoma anterior right scalp.    Right comminuted orbital floor fracture.    Right maxillary sinus fractures along the medial wall. Complete opacification of right maxillary sinus with increased density. Scattered fluid in the right ethmoid air cells.    Dental caries in the right maxillary molar.    Increased mineralization in the left mandible, suspicious for metastatic disease in this patient with known prostate cancer.    C-spine CT: No acute fracture.    Small focal suspected metastatic bone lesions in C2.    Discussed with MARINA bateman in the ED at 2:49 PM.    --- End of Report ---   [facility] DEPARTMENT OF OPHTHALMOLOGY - INITIAL ADULT CONSULT  ----------------------------------------------------------------------------------------------------  Landon Ivan MD PGY-2  Available on teams  ----------------------------------------------------------------------------------------------------    HPI: 62 y/o M with PMH of prostate ca with mets to the bone currently on chemo presents for facial bone fractures s/p fall today. Patient was walking into a [employer] when he tripped in front of the store and landed face first on to the concrete. Patient thinks he might have had LOC at the time because he does not remember the immediate events after the fall so clearly. He was eventually assisted to stand and was able to walk in to the store where he waited for an ambulance to bring him to a local hospital. In the other hospital he had imaging done of his chest, neck, face, and head and was found to have right orbital wall fracture as well as a maxillary sinus fracture. Patient was transferred to our hospital in order to see OMFS for further recommendations and care. He endorses pain over the chest wall still but had negative imaging at the other hospital. Also notes having been able to see out of the right eye when opened but with some blurriness. Denies any other complaints or concerns. Denies SOB, cough, fevers, chills, abdominal pain, N/V/D/C, urinary complaints, dizziness, numbness, tingling, weakness, sick contacts, recent travel.    Interval History: Pt states vision is around baseline. No n/v with EOMs.     PAST MEDICAL & SURGICAL HISTORY:    Past Ocular History: none  Ophthalmic Medications: none  FAMILY HISTORY:    Social History: denies etoh/smoking    MEDICATIONS  (STANDING):    MEDICATIONS  (PRN):    Allergies & Intolerances:   morphine (Short breath; Anaphylaxis)    Review of Systems:  Constitutional: No fever, chills  Eyes: swelling, pain  Neuro: No tremors  Cardiovascular: No chest pain, palpitations  Respiratory: No SOB, no cough  GI: No nausea, vomiting, abdominal pain  : No dysuria  Skin: no rash  Psych: no depression  Endocrine: no polyuria, polydipsia  Heme/lymph: no swelling    VITALS: T(C): 36.8 (01-03-24 @ 21:01)  T(F): 98.3 (01-03-24 @ 21:01), Max: 98.3 (01-03-24 @ 21:01)  HR: 87 (01-03-24 @ 21:01) (81 - 87)  BP: 155/87 (01-03-24 @ 21:01) (155/87 - 166/95)  RR:  (18 - 18)  SpO2:  (94% - 95%)  Wt(kg): --  General: AAO x 3, appropriate mood and affect    Ophthalmology Exam:  Visual acuity (cc): 20/40- OD, 20/30 OS  Pupils: PERRL OU, no APD  Ttono: 25 OD, 13 OS  Extraocular movements (EOMs): Restricted OD 2/2 chemosis; Full OS, no pain, no diplopia  Confrontational Visual Field (CVF): Full OU  Color Plates: 12/12 OD, 12/12 OS    Pen Light Exam (PLE)  External: Edema and ecchymosis OD, flat OS  Lids/Lashes/Lacrimal Ducts: Edema and ecchymosis OD, flat OS  Sclera/Conjunctiva: 4+chemosis and subconj heme OD, W+Q OS  Cornea: Cl OU  Anterior Chamber: D+F OU    Iris: Flat OU  Lens: Cl OU    Fundus Exam: dilated with 1% tropicamide and 2.5% phenylephrine  Approval obtained from Jerri Delacruz at 12:04am for dilation  Patient aware that pupils can remained dilated for at least 4-6 hours  Exam performed with 20D lens    Vitreous: wnl OU  Disc, cup/disc: sharp and pink, 0.4 OU  Macula: wnl OU  Vessels: wnl OU  Periphery: wnl OU    Labs/Imaging:  IMPRESSIONS:    Head CT: No CT evidence of acute intracranial hemorrhage.    Maxillofacial CT:  Right facial and periorbital soft tissue swelling. Focal swelling and hematoma anterior right scalp.    Right comminuted orbital floor fracture.    Right maxillary sinus fractures along the medial wall. Complete opacification of right maxillary sinus with increased density. Scattered fluid in the right ethmoid air cells.    Dental caries in the right maxillary molar.    Increased mineralization in the left mandible, suspicious for metastatic disease in this patient with known prostate cancer.    C-spine CT: No acute fracture.    Small focal suspected metastatic bone lesions in C2.    Discussed with MARINA bateman in the ED at 2:49 PM.    --- End of Report ---

## 2024-01-04 NOTE — CONSULT NOTE ADULT - ASSESSMENT
63M w/ PMHx of stage 4 prostate cancer w/ bony mestastasis (pt reports hx of chemo started in 2020), hx of knee replacement, shoulder surgery, presents as a transfer from [facility] ED to [facility] ED w/ for Right-sided orbital floor fx, periorbital ecchymosis, and mimimal displacement of the medial Right maxillary wall s/p fall on the concrete outdoors, landing his face first with uncertain LOC. Low suspicion for right sided extraocular entrapment, but blurry vision present. Maxillary wall fx stable, correlated with radiographic and clinical exam findings.    Plan:  FINAL PLAN PENDING     Oral and Maxillofacial Surgery  #[id number]  Available on [employer] Teams   63M w/ PMHx of stage 4 prostate cancer w/ bony mestastasis (pt reports hx of chemo started in 2020), hx of knee replacement, shoulder surgery, presents as a transfer from [facility] ED to [facility] ED w/ for Right-sided orbital floor fx, periorbital ecchymosis, and mimimal displacement of the medial Right maxillary wall s/p fall on the concrete outdoors, landing his face first with uncertain LOC. Low suspicion for right sided extraocular entrapment, but blurry vision present. Maxillary wall fx stable, correlated with radiographic and clinical exam findings.    Plan:  FINAL PLAN PENDING     Oral and Maxillofacial Surgery  #[id number]  Available on [employer] Teams   63M w/ PMHx of stage 4 prostate cancer w/ bony mestastasis (pt reports hx of chemo started in 2020), hx of knee replacement, shoulder surgery, presents as a transfer from [facility] ED to [facility] ED w/ for Right-sided orbital floor fx, periorbital ecchymosis, and mimimal displacement of the medial Right maxillary wall s/p fall on the concrete outdoors, landing his face first with uncertain LOC. Low suspicion for right sided extraocular entrapment, but blurry vision present. Maxillary wall fx stable, correlated with radiographic and clinical exam findings.    Plan:  - No OMFS intervention   - Sinus precautions  - Soft food diet x 2 week  - Clindamycin x 1 week, multimodal pain  - 1 week OMFS outpatient f/u as needed  Please call [phone] to schedule appt    Oral and Maxillofacial Surgery  #[id number]  Available on [employer] Teams   63M w/ PMHx of stage 4 prostate cancer w/ bony mestastasis (pt reports hx of chemo started in 2020), hx of knee replacement, shoulder surgery, presents as a transfer from [facility] ED to [facility] ED w/ for Right-sided orbital floor fx, periorbital ecchymosis, and mimimal displacement of the medial Right maxillary wall s/p fall on the concrete outdoors, landing his face first with uncertain LOC. Low suspicion for right sided extraocular entrapment, but blurry vision present. Maxillary wall fx stable, correlated with radiographic and clinical exam findings.    Plan:  - No OMFS intervention   - Sinus precautions  - Soft food diet x 2 week  - Clindamycin x 1 week, multimodal pain  - 1 week OMFS outpatient f/u as needed  Please call [phone] to schedule appt    Oral and Maxillofacial Surgery  #[id number]  Available on [employer] Teams

## 2024-06-27 ENCOUNTER — OFFICE (OUTPATIENT)
Dept: URBAN - METROPOLITAN AREA CLINIC 38 | Facility: CLINIC | Age: 64
Setting detail: OPHTHALMOLOGY
End: 2024-06-27
Payer: MEDICAID

## 2024-06-27 DIAGNOSIS — H25.13: ICD-10-CM

## 2024-06-27 PROCEDURE — 92004 COMPRE OPH EXAM NEW PT 1/>: CPT | Performed by: OPHTHALMOLOGY

## 2024-06-27 ASSESSMENT — CONFRONTATIONAL VISUAL FIELD TEST (CVF)
OS_FINDINGS: FULL
OD_FINDINGS: FULL

## 2024-07-11 ENCOUNTER — OFFICE (OUTPATIENT)
Dept: URBAN - METROPOLITAN AREA CLINIC 38 | Facility: CLINIC | Age: 64
Setting detail: OPHTHALMOLOGY
End: 2024-07-11
Payer: MEDICAID

## 2024-07-11 DIAGNOSIS — H25.13: ICD-10-CM

## 2024-07-11 DIAGNOSIS — H25.12: ICD-10-CM

## 2024-07-11 PROCEDURE — 92136 OPHTHALMIC BIOMETRY: CPT | Mod: TC | Performed by: OPHTHALMOLOGY

## 2024-07-11 PROCEDURE — 99213 OFFICE O/P EST LOW 20 MIN: CPT | Performed by: OPHTHALMOLOGY

## 2024-07-11 PROCEDURE — 92136 OPHTHALMIC BIOMETRY: CPT | Mod: LT | Performed by: OPHTHALMOLOGY

## 2024-07-11 ASSESSMENT — CONFRONTATIONAL VISUAL FIELD TEST (CVF)
OD_FINDINGS: FULL
OS_FINDINGS: FULL

## 2024-07-19 ENCOUNTER — AMBULATORY SURGERY CENTER (OUTPATIENT)
Dept: URBAN - METROPOLITAN AREA SURGERY 4 | Facility: SURGERY | Age: 64
Setting detail: OPHTHALMOLOGY
End: 2024-07-19
Payer: MEDICAID

## 2024-07-19 DIAGNOSIS — H52.212: ICD-10-CM

## 2024-07-19 DIAGNOSIS — H25.12: ICD-10-CM

## 2024-07-19 PROCEDURE — FEMTO FEMTOSECOND LASER: Mod: GY | Performed by: OPHTHALMOLOGY

## 2024-07-19 PROCEDURE — 66984 XCAPSL CTRC RMVL W/O ECP: CPT | Mod: LT | Performed by: OPHTHALMOLOGY

## 2024-07-20 ENCOUNTER — OFFICE (OUTPATIENT)
Dept: URBAN - METROPOLITAN AREA CLINIC 38 | Facility: CLINIC | Age: 64
Setting detail: OPHTHALMOLOGY
End: 2024-07-20
Payer: MEDICAID

## 2024-07-20 ENCOUNTER — RX ONLY (RX ONLY)
Age: 64
End: 2024-07-20

## 2024-07-20 DIAGNOSIS — H11.31: ICD-10-CM

## 2024-07-20 PROCEDURE — 99024 POSTOP FOLLOW-UP VISIT: CPT

## 2024-07-20 ASSESSMENT — CONFRONTATIONAL VISUAL FIELD TEST (CVF)
OS_FINDINGS: FULL
OD_FINDINGS: FULL

## 2024-07-25 ENCOUNTER — OFFICE (OUTPATIENT)
Dept: URBAN - METROPOLITAN AREA CLINIC 38 | Facility: CLINIC | Age: 64
Setting detail: OPHTHALMOLOGY
End: 2024-07-25
Payer: MEDICAID

## 2024-07-25 DIAGNOSIS — H25.11: ICD-10-CM

## 2024-07-25 PROCEDURE — 92136 OPHTHALMIC BIOMETRY: CPT | Mod: RT | Performed by: OPHTHALMOLOGY

## 2024-07-25 ASSESSMENT — CONFRONTATIONAL VISUAL FIELD TEST (CVF)
OS_FINDINGS: FULL
OD_FINDINGS: FULL

## 2024-07-30 ENCOUNTER — AMBULATORY SURGERY CENTER (OUTPATIENT)
Dept: URBAN - METROPOLITAN AREA SURGERY 4 | Facility: SURGERY | Age: 64
Setting detail: OPHTHALMOLOGY
End: 2024-07-30
Payer: MEDICAID

## 2024-07-30 DIAGNOSIS — H52.211: ICD-10-CM

## 2024-07-30 DIAGNOSIS — H25.11: ICD-10-CM

## 2024-07-30 PROCEDURE — 66984 XCAPSL CTRC RMVL W/O ECP: CPT | Mod: 79,RT | Performed by: OPHTHALMOLOGY

## 2024-07-30 PROCEDURE — FEMTO FEMTOSECOND LASER: Mod: GY | Performed by: OPHTHALMOLOGY

## 2024-07-31 ENCOUNTER — OFFICE (OUTPATIENT)
Dept: URBAN - METROPOLITAN AREA CLINIC 38 | Facility: CLINIC | Age: 64
Setting detail: OPHTHALMOLOGY
End: 2024-07-31
Payer: MEDICAID

## 2024-07-31 DIAGNOSIS — Z96.1: ICD-10-CM

## 2024-07-31 PROCEDURE — 99024 POSTOP FOLLOW-UP VISIT: CPT

## 2024-07-31 ASSESSMENT — CONFRONTATIONAL VISUAL FIELD TEST (CVF)
OS_FINDINGS: FULL
OD_FINDINGS: FULL

## 2024-08-22 ENCOUNTER — OFFICE (OUTPATIENT)
Dept: URBAN - METROPOLITAN AREA CLINIC 38 | Facility: CLINIC | Age: 64
Setting detail: OPHTHALMOLOGY
End: 2024-08-22
Payer: MEDICAID

## 2024-08-22 DIAGNOSIS — Z96.1: ICD-10-CM

## 2024-08-22 PROCEDURE — 99024 POSTOP FOLLOW-UP VISIT: CPT | Performed by: OPHTHALMOLOGY

## 2024-08-22 ASSESSMENT — CONFRONTATIONAL VISUAL FIELD TEST (CVF)
OS_FINDINGS: FULL
OD_FINDINGS: FULL

## 2025-02-21 ENCOUNTER — OFFICE (OUTPATIENT)
Dept: URBAN - METROPOLITAN AREA CLINIC 38 | Facility: CLINIC | Age: 65
Setting detail: OPHTHALMOLOGY
End: 2025-02-21
Payer: MEDICARE

## 2025-02-21 DIAGNOSIS — H16.223: ICD-10-CM

## 2025-02-21 DIAGNOSIS — H52.4: ICD-10-CM

## 2025-02-21 PROCEDURE — 92015 DETERMINE REFRACTIVE STATE: CPT

## 2025-02-21 PROCEDURE — 92014 COMPRE OPH EXAM EST PT 1/>: CPT

## 2025-02-21 ASSESSMENT — REFRACTION_MANIFEST
OD_VA1: 20/20-
OD_SPHERE: +0.50
OS_CYLINDER: SPH
OD_AXIS: 111
OD_ADD: +2.50
OS_SPHERE: PLANO
OS_ADD: +2.75
OD_VA1: 20/30-2
OD_VA2: 20/20(J1+)
OS_AXIS: 090
OU_VA: 20/20
OD_CYLINDER: -1.25
OU_VA: 20/20
OS_ADD: +2.50
OD_SPHERE: +0.25
OS_VA1: 20/20-2
OS_VA1: 20/20
OD_ADD: +2.75
OS_SPHERE: +0.50
OD_CYLINDER: -0.75
OS_VA2: 20/20(J1+)
OS_CYLINDER: -0.75
OD_AXIS: 110

## 2025-02-21 ASSESSMENT — KERATOMETRY
OD_K2POWER_DIOPTERS: 43.50
OD_AXISANGLE_DEGREES: 029
OS_AXISANGLE_DEGREES: 175
OD_K1POWER_DIOPTERS: 43.00
OS_K2POWER_DIOPTERS: 44.50
OS_K1POWER_DIOPTERS: 44.25
METHOD_AUTO_MANUAL: AUTO

## 2025-02-21 ASSESSMENT — REFRACTION_CURRENTRX
OS_VPRISM_DIRECTION: PROGS
OS_AXIS: 101
OS_SPHERE: +0.25
OS_ADD: +3.00
OS_OVR_VA: 20/
OD_ADD: +3.00
OS_CYLINDER: -1.00
OD_AXIS: 096
OD_CYLINDER: -1.25
OD_SPHERE: +0.75
OD_OVR_VA: 20/
OD_VPRISM_DIRECTION: PROGS

## 2025-02-21 ASSESSMENT — REFRACTION_AUTOREFRACTION
OS_AXIS: 088
OD_SPHERE: +0.75
OS_CYLINDER: -1.00
OD_AXIS: 111
OS_SPHERE: +0.75
OD_CYLINDER: -1.00

## 2025-02-21 ASSESSMENT — VISUAL ACUITY
OS_BCVA: 20/50
OD_BCVA: 20/20-

## 2025-02-21 ASSESSMENT — SUPERFICIAL PUNCTATE KERATITIS (SPK)
OD_SPK: T
OS_SPK: T

## 2025-02-21 ASSESSMENT — CONFRONTATIONAL VISUAL FIELD TEST (CVF)
OS_FINDINGS: FULL
OD_FINDINGS: FULL
